# Patient Record
Sex: FEMALE | Race: BLACK OR AFRICAN AMERICAN | NOT HISPANIC OR LATINO | URBAN - METROPOLITAN AREA
[De-identification: names, ages, dates, MRNs, and addresses within clinical notes are randomized per-mention and may not be internally consistent; named-entity substitution may affect disease eponyms.]

---

## 2019-12-10 ENCOUNTER — EMERGENCY (EMERGENCY)
Facility: HOSPITAL | Age: 59
LOS: 0 days | Discharge: HOME | End: 2019-12-10
Attending: EMERGENCY MEDICINE
Payer: MEDICAID

## 2019-12-10 VITALS
RESPIRATION RATE: 18 BRPM | OXYGEN SATURATION: 100 % | HEART RATE: 59 BPM | TEMPERATURE: 98 F | SYSTOLIC BLOOD PRESSURE: 136 MMHG | DIASTOLIC BLOOD PRESSURE: 70 MMHG

## 2019-12-10 VITALS — WEIGHT: 179.02 LBS | HEIGHT: 67 IN

## 2019-12-10 DIAGNOSIS — R05 COUGH: ICD-10-CM

## 2019-12-10 DIAGNOSIS — I10 ESSENTIAL (PRIMARY) HYPERTENSION: ICD-10-CM

## 2019-12-10 DIAGNOSIS — B34.9 VIRAL INFECTION, UNSPECIFIED: ICD-10-CM

## 2019-12-10 PROCEDURE — 99283 EMERGENCY DEPT VISIT LOW MDM: CPT

## 2019-12-10 RX ORDER — BROMPHENIRAMINE MALEATE, PSEUDOEPHEDRINE HYDROCHLORIDE, AND DEXTROMETHORPHAN HYDROBROMIDE 2; 10; 30 MG/5ML; MG/5ML; MG/5ML
5 SOLUTION ORAL
Qty: 100 | Refills: 0
Start: 2019-12-10 | End: 2019-12-14

## 2019-12-10 NOTE — ED STATDOCS - PATIENT PORTAL LINK FT
You can access the FollowMyHealth Patient Portal offered by [facility] by registering at the following website: [email or web address]. By joining [employer]’s FollowMyHealth portal, you will also be able to view your health information using other applications (apps) compatible with our system.

## 2019-12-10 NOTE — ED STATDOCS - ATTENDING CONTRIBUTION TO CARE
I, Brissa De León MD,  performed the initial face to face bedside interview with this patient regarding history of present illness, review of symptoms and relevant past medical, social and family history.  I completed an independent physical examination.  I was the initial provider who evaluated this patient. I have signed out the follow up of any pending tests (i.e. labs, radiological studies) to the ACP.  I have communicated the patient’s plan of care and disposition with the ACP.  The history, relevant review of systems, past medical and surgical history, medical decision making, and physical examination was documented by the scribe in my presence and I attest to the accuracy of the documentation.

## 2019-12-10 NOTE — ED STATDOCS - PROGRESS NOTE DETAILS
57 yo female with a PMH of htn presents with cough x 1 week. Pt was evaluated by urgent care and was rx z pack and was taking Robitussin without relief. Denies fever, cp, sob. Discussed with pt that symptoms may still linger for weeks because it appears to be related to a viral infection. Pt states the robitussin wasn't working and would like a different cough syrup. Will rx bromifed and tessalon perles to her pharmacy but also instructed to hydrate and rest to help with any kind of virus. Pt aware and agrees with plan. -aRh Banerjee PA-C

## 2019-12-10 NOTE — ED STATDOCS - OBJECTIVE STATEMENT
57 y/o F with no relevant PMHx presents to the ED c/o a cough. Pt was seen at  on 12/3 and was prescribed abx and Robitussin with which she has been compliant. States that medications have provided no relief in symptoms. States she was sick before going to . Denies N/V.

## 2019-12-10 NOTE — ED STATDOCS - NSFOLLOWUPINSTRUCTIONS_ED_ALL_ED_FT
Upper Respiratory Infection, Adult  An upper respiratory infection (URI) affects the nose, throat, and upper air passages. URIs are caused by germs (viruses). The most common type of URI is often called "the common cold."    Medicines cannot cure URIs, but you can do things at home to relieve your symptoms. URIs usually get better within 7–10 days.    Follow these instructions at home:  Activity     Rest as needed.  If you have a fever, stay home from work or school until your fever is gone, or until your doctor says you may return to work or school.    You should stay home until you cannot spread the infection anymore (you are not contagious).  Your doctor may have you wear a face mask so you have less risk of spreading the infection.    Relieving symptoms     Gargle with a salt-water mixture 3–4 times a day or as needed. To make a salt-water mixture, completely dissolve ½–1 tsp of salt in 1 cup of warm water.  Use a cool-mist humidifier to add moisture to the air. This can help you breathe more easily.  Eating and drinking     Drink enough fluid to keep your pee (urine) pale yellow.  ImageEat soups and other clear broths.  General instructions     Take over-the-counter and prescription medicines only as told by your doctor. These include cold medicines, fever reducers, and cough suppressants.  Do not use any products that contain nicotine or tobacco. These include cigarettes and e-cigarettes. If you need help quitting, ask your doctor.  Avoid being where people are smoking (avoid secondhand smoke).  Make sure you get regular shots and get the flu shot every year.  ImageKeep all follow-up visits as told by your doctor. This is important.  How to avoid spreading infection to others     Wash your hands often with soap and water. If you do not have soap and water, use hand .  Avoid touching your mouth, face, eyes, or nose.  ImageCough or sneeze into a tissue or your sleeve or elbow. Do not cough or sneeze into your hand or into the air.  Contact a doctor if:  You are getting worse, not better.  You have any of these:    A fever.  Chills.  Brown or red mucus in your nose.  Yellow or brown fluid (discharge)coming from your nose.  Pain in your face, especially when you bend forward.  Swollen neck glands.  Pain with swallowing.  White areas in the back of your throat.    Get help right away if:  You have shortness of breath that gets worse.  You have very bad or constant:    Headache.  Ear pain.  Pain in your forehead, behind your eyes, and over your cheekbones (sinus pain).  Chest pain.    You have long-lasting (chronic) lung disease along with any of these:    Wheezing.  Long-lasting cough.  Coughing up blood.  A change in your usual mucus.    You have a stiff neck.  You have changes in your:    Vision.  Hearing.  Thinking.  Mood.    Summary  An upper respiratory infection (URI) is caused by a germ called a virus. The most common type of URI is often called "the common cold."  URIs usually get better within 7–10 days.  Take over-the-counter and prescription medicines only as told by your doctor.  This information is not intended to replace advice given to you by your health care provider. Make sure you discuss any questions you have with your health care provider.

## 2019-12-10 NOTE — ED ADULT TRIAGE NOTE - CHIEF COMPLAINT QUOTE
Pt reports cough for which she was evaluated by urgent care. Pt reports taking medication, but cont to have cough.

## 2020-03-07 ENCOUNTER — EMERGENCY (EMERGENCY)
Facility: HOSPITAL | Age: 60
LOS: 0 days | Discharge: HOME | End: 2020-03-07
Attending: EMERGENCY MEDICINE
Payer: MEDICAID

## 2020-03-07 VITALS — HEIGHT: 67 IN | WEIGHT: 181 LBS

## 2020-03-07 VITALS
SYSTOLIC BLOOD PRESSURE: 129 MMHG | DIASTOLIC BLOOD PRESSURE: 84 MMHG | RESPIRATION RATE: 18 BRPM | HEART RATE: 64 BPM | TEMPERATURE: 98 F | OXYGEN SATURATION: 100 %

## 2020-03-07 DIAGNOSIS — E11.9 TYPE 2 DIABETES MELLITUS WITHOUT COMPLICATIONS: ICD-10-CM

## 2020-03-07 DIAGNOSIS — I10 ESSENTIAL (PRIMARY) HYPERTENSION: ICD-10-CM

## 2020-03-07 DIAGNOSIS — M79.10 MYALGIA, UNSPECIFIED SITE: ICD-10-CM

## 2020-03-07 DIAGNOSIS — Z79.899 OTHER LONG TERM (CURRENT) DRUG THERAPY: ICD-10-CM

## 2020-03-07 DIAGNOSIS — R53.83 OTHER FATIGUE: ICD-10-CM

## 2020-03-07 PROCEDURE — 93005 ELECTROCARDIOGRAM TRACING: CPT

## 2020-03-07 PROCEDURE — 93010 ELECTROCARDIOGRAM REPORT: CPT

## 2020-03-07 PROCEDURE — 99283 EMERGENCY DEPT VISIT LOW MDM: CPT

## 2020-03-07 NOTE — ED STATDOCS - NSFOLLOWUPINSTRUCTIONS_ED_ALL_ED_FT
Fatigue  If you have fatigue, you feel tired all the time and have a lack of energy or a lack of motivation. Fatigue may make it difficult to start or complete tasks because of exhaustion. In general, occasional or mild fatigue is often a normal response to activity or life. However, long-lasting (chronic) or extreme fatigue may be a symptom of a medical condition.  Follow these instructions at home:  General instructions     Watch your fatigue for any changes.Go to bed and get up at the same time every day.Avoid fatigue by pacing yourself during the day and getting enough sleep at night.Maintain a healthy weight.Medicines     Take over-the-counter and prescription medicines only as told by your health care provider.Take a multivitamin, if told by your health care provider. Do not use herbal or dietary supplements unless they are approved by your health care provider.Activity        Exercise regularly, as told by your health care provider.Use or practice techniques to help you relax, such as yoga, benjamin chi, meditation, or massage therapy.Eating and drinking        Avoid heavy meals in the evening.Eat a well-balanced diet, which includes lean proteins, whole grains, plenty of fruits and vegetables, and low-fat dairy products.Avoid consuming too much caffeine.Avoid the use of alcohol.Drink enough fluid to keep your urine pale yellow.Lifestyle     Change situations that cause you stress. Try to keep your work and personal schedule in balance.Do not use any products that contain nicotine or tobacco, such as cigarettes and e-cigarettes. If you need help quitting, ask your health care provider.Do not use drugs.Contact a health care provider if:  Your fatigue does not get better.You have a fever.You suddenly lose or gain weight.You have headaches.You have trouble falling asleep or sleeping through the night.You feel angry, guilty, anxious, or sad.You are unable to have a bowel movement (constipation).Your skin is dry.You have swelling in your legs or another part of your body.Get help right away if:  You feel confused.Your vision is blurry.You feel faint or you pass out.You have a severe headache.You have severe pain in your abdomen, your back, or the area between your waist and hips (pelvis).You have chest pain, shortness of breath, or an irregular or fast heartbeat.You are unable to urinate, or you urinate less than normal.You have abnormal bleeding, such as bleeding from the rectum, vagina, nose, lungs, or nipples.You vomit blood.You have thoughts about hurting yourself or others.If you ever feel like you may hurt yourself or others, or have thoughts about taking your own life, get help right away. You can go to your nearest emergency department or call:   Your local emergency services (911 in the [place] A suicide crisis helpline, such as the National Suicide Prevention Lifeline at 1-771.772.2769. This is open 24 hours a day. Summary  If you have fatigue, you feel tired all the time and have a lack of energy or a lack of motivation.Fatigue may make it difficult to start or complete tasks because of exhaustion.Long-lasting (chronic) or extreme fatigue may be a symptom of a medical condition.Exercise regularly, as told by your health care provider.Change situations that cause you stress. Try to keep your work and personal schedule in balance.This information is not intended to replace advice given to you by your health care provider. Make sure you discuss any questions you have with your health care provider.

## 2020-03-07 NOTE — ED STATDOCS - OBJECTIVE STATEMENT
60 y/o female with a PMHx of HTN or DM, presents to the ED c/o intermittent fatigue x days. States that when she feels tired, it is moreso of a weak sensation. Denies fever, cough, chills, runny nose, abd pain, or vaginal bleeding Does not have a hx of feeling this in past. Taken amlodipine today. PCP is in [place]. Next appointment is 3/27/2020. Pt works as a home health aide. No other complaints at this time.

## 2020-03-07 NOTE — ED STATDOCS - PROGRESS NOTE DETAILS
Patient seen and evaluated with ED attending at intake.  Normal vitals, well appearing, no acute complaints except feeling extra fatigued the past couple of days.  She lives in the [place] and gets her care there.  recommended some screening tests which she declines as she needs to leave.  She will follow up with her PMD -Belén Zazueta PA-C

## 2020-03-07 NOTE — ED STATDOCS - PATIENT PORTAL LINK FT
You can access the FollowMyHealth Patient Portal offered by [facility] by registering at the following website: [email or web address]. By joining [employer]’s FollowMyHealth portal, you will also be able to view your health information using other applications (apps) compatible with our system.

## 2022-07-08 ENCOUNTER — EMERGENCY (EMERGENCY)
Facility: HOSPITAL | Age: 62
LOS: 0 days | Discharge: HOME | End: 2022-07-08
Attending: EMERGENCY MEDICINE
Payer: MEDICAID

## 2022-07-08 VITALS — HEIGHT: 67 IN | WEIGHT: 179.9 LBS

## 2022-07-08 VITALS
TEMPERATURE: 98 F | RESPIRATION RATE: 18 BRPM | SYSTOLIC BLOOD PRESSURE: 113 MMHG | HEART RATE: 56 BPM | OXYGEN SATURATION: 100 % | DIASTOLIC BLOOD PRESSURE: 69 MMHG

## 2022-07-08 DIAGNOSIS — I10 ESSENTIAL (PRIMARY) HYPERTENSION: ICD-10-CM

## 2022-07-08 DIAGNOSIS — E11.9 TYPE 2 DIABETES MELLITUS WITHOUT COMPLICATIONS: ICD-10-CM

## 2022-07-08 DIAGNOSIS — R10.10 UPPER ABDOMINAL PAIN, UNSPECIFIED: ICD-10-CM

## 2022-07-08 DIAGNOSIS — R07.89 OTHER CHEST PAIN: ICD-10-CM

## 2022-07-08 DIAGNOSIS — Z20.822 CONTACT WITH AND (SUSPECTED) EXPOSURE TO COVID-19: ICD-10-CM

## 2022-07-08 DIAGNOSIS — M54.9 DORSALGIA, UNSPECIFIED: ICD-10-CM

## 2022-07-08 LAB
ALBUMIN SERPL ELPH-MCNC: 3.9 G/DL — SIGNIFICANT CHANGE UP (ref 3.3–5)
ALP SERPL-CCNC: 132 U/L — HIGH (ref 40–120)
ALT FLD-CCNC: 22 U/L — SIGNIFICANT CHANGE UP (ref 12–78)
ANION GAP SERPL CALC-SCNC: 4 MMOL/L — LOW (ref 5–17)
APPEARANCE UR: CLEAR — SIGNIFICANT CHANGE UP
AST SERPL-CCNC: 14 U/L — LOW (ref 15–37)
BASOPHILS # BLD AUTO: 0.03 K/UL — SIGNIFICANT CHANGE UP (ref 0–0.2)
BASOPHILS NFR BLD AUTO: 0.4 % — SIGNIFICANT CHANGE UP (ref 0–2)
BILIRUB SERPL-MCNC: 0.5 MG/DL — SIGNIFICANT CHANGE UP (ref 0.2–1.2)
BILIRUB UR-MCNC: NEGATIVE — SIGNIFICANT CHANGE UP
BUN SERPL-MCNC: 11 MG/DL — SIGNIFICANT CHANGE UP (ref 7–23)
CALCIUM SERPL-MCNC: 9.7 MG/DL — SIGNIFICANT CHANGE UP (ref 8.5–10.1)
CHLORIDE SERPL-SCNC: 105 MMOL/L — SIGNIFICANT CHANGE UP (ref 96–108)
CO2 SERPL-SCNC: 31 MMOL/L — SIGNIFICANT CHANGE UP (ref 22–31)
COLOR SPEC: ABNORMAL
CREAT SERPL-MCNC: 0.92 MG/DL — SIGNIFICANT CHANGE UP (ref 0.5–1.3)
DIFF PNL FLD: ABNORMAL
EGFR: 71 ML/MIN/1.73M2 — SIGNIFICANT CHANGE UP
EOSINOPHIL # BLD AUTO: 0.11 K/UL — SIGNIFICANT CHANGE UP (ref 0–0.5)
EOSINOPHIL NFR BLD AUTO: 1.4 % — SIGNIFICANT CHANGE UP (ref 0–6)
GLUCOSE SERPL-MCNC: 90 MG/DL — SIGNIFICANT CHANGE UP (ref 70–99)
GLUCOSE UR QL: NEGATIVE — SIGNIFICANT CHANGE UP
HCT VFR BLD CALC: 39.2 % — SIGNIFICANT CHANGE UP (ref 34.5–45)
HGB BLD-MCNC: 12.9 G/DL — SIGNIFICANT CHANGE UP (ref 11.5–15.5)
IMM GRANULOCYTES NFR BLD AUTO: 0.2 % — SIGNIFICANT CHANGE UP (ref 0–1.5)
KETONES UR-MCNC: ABNORMAL
LEUKOCYTE ESTERASE UR-ACNC: ABNORMAL
LYMPHOCYTES # BLD AUTO: 3.15 K/UL — SIGNIFICANT CHANGE UP (ref 1–3.3)
LYMPHOCYTES # BLD AUTO: 39.1 % — SIGNIFICANT CHANGE UP (ref 13–44)
MCHC RBC-ENTMCNC: 29.5 PG — SIGNIFICANT CHANGE UP (ref 27–34)
MCHC RBC-ENTMCNC: 32.9 GM/DL — SIGNIFICANT CHANGE UP (ref 32–36)
MCV RBC AUTO: 89.7 FL — SIGNIFICANT CHANGE UP (ref 80–100)
MONOCYTES # BLD AUTO: 0.78 K/UL — SIGNIFICANT CHANGE UP (ref 0–0.9)
MONOCYTES NFR BLD AUTO: 9.7 % — SIGNIFICANT CHANGE UP (ref 2–14)
NEUTROPHILS # BLD AUTO: 3.97 K/UL — SIGNIFICANT CHANGE UP (ref 1.8–7.4)
NEUTROPHILS NFR BLD AUTO: 49.2 % — SIGNIFICANT CHANGE UP (ref 43–77)
NITRITE UR-MCNC: NEGATIVE — SIGNIFICANT CHANGE UP
PH UR: 5 — SIGNIFICANT CHANGE UP (ref 5–8)
PLATELET # BLD AUTO: 337 K/UL — SIGNIFICANT CHANGE UP (ref 150–400)
POTASSIUM SERPL-MCNC: 3.7 MMOL/L — SIGNIFICANT CHANGE UP (ref 3.5–5.3)
POTASSIUM SERPL-SCNC: 3.7 MMOL/L — SIGNIFICANT CHANGE UP (ref 3.5–5.3)
PROT SERPL-MCNC: 8.4 GM/DL — HIGH (ref 6–8.3)
PROT UR-MCNC: 15
RBC # BLD: 4.37 M/UL — SIGNIFICANT CHANGE UP (ref 3.8–5.2)
RBC # FLD: 14.2 % — SIGNIFICANT CHANGE UP (ref 10.3–14.5)
SODIUM SERPL-SCNC: 140 MMOL/L — SIGNIFICANT CHANGE UP (ref 135–145)
SP GR SPEC: 1.02 — SIGNIFICANT CHANGE UP (ref 1.01–1.02)
TROPONIN I, HIGH SENSITIVITY RESULT: 3.16 NG/L — SIGNIFICANT CHANGE UP
UROBILINOGEN FLD QL: 1
WBC # BLD: 8.06 K/UL — SIGNIFICANT CHANGE UP (ref 3.8–10.5)
WBC # FLD AUTO: 8.06 K/UL — SIGNIFICANT CHANGE UP (ref 3.8–10.5)

## 2022-07-08 PROCEDURE — 84484 ASSAY OF TROPONIN QUANT: CPT

## 2022-07-08 PROCEDURE — 80053 COMPREHEN METABOLIC PANEL: CPT

## 2022-07-08 PROCEDURE — 74176 CT ABD & PELVIS W/O CONTRAST: CPT | Mod: MD

## 2022-07-08 PROCEDURE — 87086 URINE CULTURE/COLONY COUNT: CPT

## 2022-07-08 PROCEDURE — 99285 EMERGENCY DEPT VISIT HI MDM: CPT

## 2022-07-08 PROCEDURE — 93005 ELECTROCARDIOGRAM TRACING: CPT

## 2022-07-08 PROCEDURE — 85025 COMPLETE CBC W/AUTO DIFF WBC: CPT

## 2022-07-08 PROCEDURE — 99285 EMERGENCY DEPT VISIT HI MDM: CPT | Mod: 25

## 2022-07-08 PROCEDURE — 71250 CT THORAX DX C-: CPT | Mod: MD

## 2022-07-08 PROCEDURE — 96372 THER/PROPH/DIAG INJ SC/IM: CPT

## 2022-07-08 PROCEDURE — 36415 COLL VENOUS BLD VENIPUNCTURE: CPT

## 2022-07-08 PROCEDURE — 71250 CT THORAX DX C-: CPT | Mod: 26,MD

## 2022-07-08 PROCEDURE — 74176 CT ABD & PELVIS W/O CONTRAST: CPT | Mod: 26,MD

## 2022-07-08 PROCEDURE — U0003: CPT

## 2022-07-08 PROCEDURE — 81001 URINALYSIS AUTO W/SCOPE: CPT

## 2022-07-08 PROCEDURE — U0005: CPT

## 2022-07-08 PROCEDURE — 93010 ELECTROCARDIOGRAM REPORT: CPT

## 2022-07-08 RX ORDER — SODIUM CHLORIDE 9 MG/ML
1000 INJECTION INTRAMUSCULAR; INTRAVENOUS; SUBCUTANEOUS ONCE
Refills: 0 | Status: COMPLETED | OUTPATIENT
Start: 2022-07-08 | End: 2022-07-08

## 2022-07-08 RX ORDER — SODIUM CHLORIDE 9 MG/ML
100 INJECTION INTRAMUSCULAR; INTRAVENOUS; SUBCUTANEOUS ONCE
Refills: 0 | Status: DISCONTINUED | OUTPATIENT
Start: 2022-07-08 | End: 2022-07-08

## 2022-07-08 RX ORDER — KETOROLAC TROMETHAMINE 30 MG/ML
30 SYRINGE (ML) INJECTION ONCE
Refills: 0 | Status: DISCONTINUED | OUTPATIENT
Start: 2022-07-08 | End: 2022-07-08

## 2022-07-08 RX ADMIN — Medication 30 MILLIGRAM(S): at 20:49

## 2022-07-08 RX ADMIN — SODIUM CHLORIDE 1000 MILLILITER(S): 9 INJECTION INTRAMUSCULAR; INTRAVENOUS; SUBCUTANEOUS at 18:16

## 2022-07-08 NOTE — ED STATDOCS - NEUROLOGICAL, MLM
sensation is normal and strength is normal. sensation is normal and strength is normal. CNs 2-12 intact. NIH=0 GCS=15. No nuchal rigidity. No saddle anesthesia.

## 2022-07-08 NOTE — ED STATDOCS - CHPI ED SYMPTOM NEG
no fever no burning urination/no fever/no abdominal distention/no blood in stool/no vomiting/no palpitations

## 2022-07-08 NOTE — ED ADULT TRIAGE NOTE - CHIEF COMPLAINT QUOTE
patient presenting ambulatory to ED c/o back pain x2 days. denies fall, injury. patient able to ambulate at baseline.

## 2022-07-08 NOTE — ED STATDOCS - NSFOLLOWUPINSTRUCTIONS_ED_ALL_ED_FT
Please note that I have provided you with the results of your labs and imaging. As discussed your imaging has certain findings of abnormalities that require you to see a gastroenterologist as soon as possible. There is finding of a colonic mass in the Rt abdomen however you have no obstruction in the imaging, you are eating and drinking and you have no fever or chills. YOU MUST SEE A GASTROENTEROLOGIST IN 4 DAYS OR LESS, and if you can not make an appointment with your own gastroenterologist since you wish to go to [place] you must return to us immediately or contact the number provided here for an gastroenterologist. You also need to see a pulmonary doctor. If you do not have your own then I have provided you with the contact number for one. Please return to us immediately if you have any fever, chills, can not eat, worsening of pain or if you have any other concerns.    Please make sure that you show the copies of the images that I printed for you and that are also included in this discharge form and show them to your primary care physician as you will require further abdominal imaging such as an MRI or further testing such as a colonoscopy. Please return to us immediately if you have any other health concerns.     Please return to us if your pain is worsened. As discussed while less likely you also have to consider possibility of stress on the heart so if you have pain in the center of the chest or if you have any other health concerns return to us and definitely arrange for a follow up with a cardiology too. If you do not have your own then I have provided you with the number for one to contact here.     ________________      Nonspecific Chest Pain, Adult      Chest pain is an uncomfortable, tight, or painful feeling in the chest. The pain can feel like a crushing, aching, or squeezing pressure. A person can feel a burning or tingling sensation. Chest pain can also be felt in your back, neck, jaw, shoulder, or arm. This pain can be worse when you move, sneeze, or take a deep breath.    Chest pain can be caused by a condition that is life-threatening. This must be treated right away. It can also be caused by something that is not life-threatening. If you have chest pain, it can be hard to know the difference, so it is important to get help right away to make sure that you do not have a serious condition.    Some life-threatening causes of chest pain include:  •Heart attack.      •A tear in the body's main blood vessel (aortic dissection).      •Inflammation around your heart (pericarditis).      •A problem in the lungs, such as a blood clot (pulmonary embolism) or a collapsed lung (pneumothorax).      Some non life-threatening causes of chest pain include:  •Heartburn.      •Anxiety or stress.      •Damage to the bones, muscles, and cartilage that make up your chest wall.      •Pneumonia or bronchitis.      •Shingles infection (varicella-zoster virus).      Your chest pain may come and go. It may also be constant. Your health care provider will do tests and other studies to find the cause of your pain. Treatment will depend on the cause of your chest pain.      Follow these instructions at home:    Medicines     •Take over-the-counter and prescription medicines only as told by your health care provider.      •If you were prescribed an antibiotic medicine, take it as told by your health care provider. Do not stop taking the antibiotic even if you start to feel better.      Activity     •Avoid any activities that cause chest pain.      • Do not lift anything that is heavier than 10 lb (4.5 kg), or the limit that you are told, until your health care provider says that it is safe.      •Rest as directed by your health care provider.       •Return to your normal activities only as told by your health care provider. Ask your health care provider what activities are safe for you.        Lifestyle       A plate along with examples of foods in a healthy diet.       Silhouette of a person sitting on the floor doing yoga.     • Do not use any products that contain nicotine or tobacco, such as cigarettes, e-cigarettes, and chewing tobacco. If you need help quitting, ask your health care provider.      • Do not drink alcohol.    •Make healthy lifestyle changes as recommended. These may include:  •Getting regular exercise. Ask your health care provider to suggest some exercises that are safe for you.      •Eating a heart-healthy diet. This includes plenty of fresh fruits and vegetables, whole grains, low-fat (lean) protein, and low-fat dairy products. A dietitian can help you find healthy eating options.      •Maintaining a healthy weight.      •Managing any other health conditions you may have, such as high blood pressure (hypertension) or diabetes.      •Reducing stress, such as with yoga or relaxation techniques.        General instructions     •Pay attention to any changes in your symptoms.      •It is up to you to get the results of any tests that were done. Ask your health care provider, or the department that is doing the tests, when your results will be ready.      •Keep all follow-up visits as told by your health care provider. This is important.       •You may be asked to go for further testing if your chest pain does not go away.        Contact a health care provider if:    •Your chest pain does not go away.      •You feel depressed.      •You have a fever.      •You notice changes in your symptoms or develop new symptoms.        Get help right away if:    •Your chest pain gets worse.      •You have a cough that gets worse, or you cough up blood.      •You have severe pain in your abdomen.      •You faint.      •You have sudden, unexplained chest discomfort.      •You have sudden, unexplained discomfort in your arms, back, neck, or jaw.      •You have shortness of breath at any time.      •You suddenly start to sweat, or your skin gets clammy.      •You feel nausea or you vomit.      •You suddenly feel lightheaded or dizzy.      •You have severe weakness, or unexplained weakness or fatigue.      •Your heart begins to beat quickly, or it feels like it is skipping beats.      These symptoms may represent a serious problem that is an emergency. Do not wait to see if the symptoms will go away. Get medical help right away. Call your local emergency services (911 in the [place] Do not drive yourself to the hospital.       Summary    •Chest pain can be caused by a condition that is serious and requires urgent treatment. It may also be caused by something that is not life-threatening.      •Your health care provider may do lab tests and other studies to find the cause of your pain.      •Follow your health care provider's instructions on taking medicines, making lifestyle changes, and getting emergency treatment if symptoms become worse.      •Keep all follow-up visits as told by your health care provider. This includes visits for any further testing if your chest pain does not go away.      This information is not intended to replace advice given to you by your health care provider. Make sure you discuss any questions you have with your health care provider.    ____________      Colon Mass, Adult       A colon mass is an abnormal growth in the colon, which is part of the large intestine. A mass can cause a blockage (obstruction) or bleeding in the colon.      What are the causes?    This condition may be caused by:  •Cancer.      •Blood vessel problems.      •Infection.    •Certain colon or bowel diseases. These include:  •Inflammatory bowel disease, such as ulcerative colitis or Crohn's disease.      •Diverticulitis. This is inflammation or infection of small pouches in the bowel.        •Endometriosis. This is a condition in which the lining of the uterus grows outside of the uterus.      •Scar tissue (adhesions) from a past surgery on the abdomen or from benign tumors such as a lipoma, teratoma, or hemangioma.      •Volvulus. This is twisting of the intestine.        What are the signs or symptoms?    Symptoms of this condition include:  •Cramping, nausea, diarrhea, or vomiting.      •A fever or feeling weak.      •Pain in the abdomen, side, or back.      •Weight loss or loss of appetite.      •Constipation or changes in bowel habits.      •Bleeding from the rectum.      •Feeling the need to have a bowel movement after having had one recently.      In some cases, there are no symptoms of this condition.      How is this diagnosed?    This condition may be diagnosed based on tests and procedures that are done to learn more about the mass. These may include:  •Blood tests.      •X-rays, ultrasound, a CT scan, or an MRI.      •Colonoscopy. In this procedure, a flexible tube that has oil or gel on it (is lubricated) is inserted into the opening between the buttocks (anus) and then passed into the rectum and colon to examine the areas.      •Biopsy. This is removal of a tissue sample from the mass to be looked at under a microscope. A biopsy may be taken during a colonoscopy.      In some cases, what seems like a colon mass may actually be something else, such as scarring that formed after a surgery or an ulcer.      How is this treated?    Treatment for this condition depends on the cause of the mass. You and your health care provider will discuss the meaning of your test results and the recommended steps for starting treatment. If there are serious concerns, emergency surgery may be needed.      Follow these instructions at home:    What you need to do at home depends on the cause of the mass. Follow instructions from your health care provider. In general:  •Take over-the-counter and prescription medicines only as told by your health care provider.      •Keep all follow-up visits. This is important.        Contact a health care provider if:    •Your symptoms get worse.      •You have new symptoms.      •You have a fever.      •Medicine does not help your pain.      •You feel weaker.      •You bruise or bleed easily.        Get help right away if:

## 2022-07-08 NOTE — ED STATDOCS - NS ED ATTENDING STATEMENT MOD
This was a shared visit with the ANU. I reviewed and verified the documentation and independently performed the documented:

## 2022-07-08 NOTE — ED STATDOCS - ATTENDING APP SHARED VISIT CONTRIBUTION OF CARE
I Chris Sears MD saw and examined the patient. MLP saw and examined the patient under my supervision. I discussed the care of the patient with MLP and agree with MLP's plan, assessment and care of the patient while in the ED.

## 2022-07-08 NOTE — ED STATDOCS - CARE PROVIDER_API CALL
Viral Angulo)  Gastroenterology; Internal Medicine  [address], [address]  [place] [address]  Phone: [phone]  Fax: [phone]  Follow Up Time:     Serg Juárez)  Internal Medicine; Pulmonary Disease  [address]  [place] [address]  Phone: [phone]  Fax: [phone]  Follow Up Time:

## 2022-07-08 NOTE — ED STATDOCS - CLINICAL SUMMARY MEDICAL DECISION MAKING FREE TEXT BOX
Pt with non pleuritic right thoracic CP and right flank pain. Pt on Zithro x2days. Will get EKG, 1 set of trop to r/o acs. Given that pt states the pain is 8/10 and pain is in ribs/flank and age of 61 will get [address] non con of chest and abd to r/o pathology. If unremarkable will dc.

## 2022-07-08 NOTE — ED STATDOCS - SOCIAL CONCERNS
Message   Recorded as Task   Date: 05/31/2016 01:16 PM, Created By: Ariadna Tapia   Task Name: Med Renewal Request   Assigned To: Gerson Salazar   Regarding Patient: Brandon Browne, Status: Active   CommentKathy Cunha - 31 May 2016 1:16 PM     TASK CREATED    sent rx to jessica campbell w/ cg in june        Active Problems    1  Abdominal left lower quadrant tenderness (789 64) (R10 814)   2  Acute conjunctivitis (372 00) (H10 30)   3  Acute otitis media (382 9) (H66 90)   4  Acute sinusitis (461 9) (J01 90)   5  Anxiety (300 00) (F41 9)   6  Back pain (724 5) (M54 9)   7  Bloating (787 3) (R14 0)   8  Chronic constipation (564 00) (K59 09)   9  Contraception (V25 9) (Z30 9)   10  Generalized anxiety disorder (300 02) (F41 1)   11  GERD without esophagitis (530 81) (K21 9)   12  Globus sensation (306 4) (F45 8)   13  Hyperlipidemia (272 4) (E78 5)   14  Laboratory examination ordered as part of a routine general medical examination    (V72 62) (Z00 00)   15  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)   16  Nausea (787 02) (R11 0)   17  Sorethroat (462) (J02 9)    Current Meds   1  Apri 0 15-30 MG-MCG Oral Tablet; TAKE 1 TABLET DAILY; Therapy: 74EBU0574 to (Candelaria Millard)  Requested for: 58SCI1154; Last   Rx:[id number] Ordered   2  Colace 100 MG Oral Capsule; TAKE 1 CAPSULE DAILY; Therapy: 66VDT7098 to (Evaluate:17Mar2017)  Requested for: 48PYS0068; Last   Rx:22Mar2016 Ordered   3  Fluticasone Propionate 50 MCG/ACT Nasal Suspension (Flonase); USE 2 SPRAYS IN   EACH NOSTRIL ONCE DAILY; Therapy: 13RAL0991 to (Mary Ramos)  Requested for: 35ZRU8014; Last   Rx:[id number] Ordered   4  MiraLax Oral Powder (Polyethylene Glycol 3350); MIX 1 CAPFUL IN 8 OUNCES OF   WATER AND DRINK DAILY AS DIRECTED; Therapy: (Recorded:14Ruy1805) to Recorded   5  Polyethylene Glycol 3350 Oral Packet (MiraLax); MIX 1 PACKET IN 8 OUNCES OF   LIQUID AND DRINK ONCE DAILY;    Therapy: 70HJC0104 to (Evaluate:17Mar2017)  Requested for: 29XVQ9711; Last   Rx:22Mar2016 Ordered   6  Prenatal 28-0 8 MG Oral Tablet; TAKE 1 TABLET DAILY; Therapy: (Recorded:28Lrt3596) to Recorded   7  Probiotic CAPS; Therapy: (Recorded:90Xnp8585) to Recorded   8  Sertraline HCl - 25 MG Oral Tablet (Zoloft); TAKE 1 TABLET DAILY; Therapy: 07JBC0617 to (Suly Husbands)  Requested for: 96SWI8710; Last   SW:07MRV5778; Status: ACTIVE - Renewal Denied Ordered   9  Sulfamethoxazole-Trimethoprim 800-160 MG Oral Tablet (Bactrim DS); Take 1 tablet   twice daily; Therapy: 17KHK9615 to (Evaluate:26May2016); Last Rx:19May2016 Ordered    Allergies    1  Amoxicillin-Pot Clavulanate TABS   2  fluoxetine   3  Penicillins    4  No Known Food Allergies   5   Seasonal    Plan  PMH: History of constipation    · Apri 0 15-30 MG-MCG Oral Tablet; TAKE 1 TABLET DAILY    Signatures   Electronically signed by : Deyanira Calderón, ; May 31 2016  1:17PM EST                       (Author) None

## 2022-07-08 NOTE — ED STATDOCS - NS_ ATTENDINGSCRIBEDETAILS _ED_A_ED_FT
I Chris Sears MD saw and examined the patient. Scribe documented for me and under my supervision. I have modified the scribe's documentation where necessary to reflect my history, physical exam and other relevant documentations pertinent to the care of the patient.

## 2022-07-08 NOTE — ED STATDOCS - CARE PLAN
1 Principal Discharge DX:	Flank pain  Secondary Diagnosis:	Chest wall pain   Principal Discharge DX:	Flank pain  Goal:	abnormal CT finding  Secondary Diagnosis:	Chest wall pain

## 2022-07-08 NOTE — ED STATDOCS - PROGRESS NOTE DETAILS
Alcira Kent for attending Dr. Sears   Updated pt on results of labs and imaging. Orion GAINES: Had a full in depth discussion with patient about the results of her labs and imaging. Imaging shows possible Rt sided colonic mass pending further evaluation. Patient is nontoxic appearing, tolerating PO, pain managed, will give patient an additional dose of Toradol to ensure no more pain. No evidence of fracture or dislocation. Patient is ambulatory. Patient is aware that she needs to follow up with her primary care physician, needs to see a gastroenterologist as soon as possible to have a colonoscopy to ensure no evidence of cancer or tumor. Spoke with patient about abnormal findings in the lungs as well. Noted the kidney stone. Patient is comfortable to go home, will follow up with her PMD at [place] and make the necessary arrangements. Orion GAINES: Had a full in depth discussion with patient about the results of her labs and imaging. Imaging shows possible Rt sided colonic mass pending further evaluation. Patient is nontoxic appearing, tolerating PO, pain managed, will give patient an additional dose of Toradol to ensure no more pain. No evidence of fracture or dislocation. Patient is ambulatory. Patient is aware that she needs to follow up with her primary care physician, needs to see a gastroenterologist as soon as possible to have a colonoscopy to ensure no evidence of cancer or tumor. Spoke with patient about abnormal findings in the lungs as well. High suspicion of tumorous or cancer process with incidental discovery in the ED. Patient is AAOx4, understands the need for follow up to see PMD, gastro, and potentially follow up with oncology. Noted the kidney stone. Patient is comfortable to go home, will follow up with her PMD at [place] and make the necessary arrangements.

## 2022-07-08 NOTE — ED STATDOCS - CARE PROVIDERS DIRECT ADDRESSES
,DirectAddress_Unknown,[email or web address], [email or web address]

## 2022-07-08 NOTE — ED STATDOCS - OBJECTIVE STATEMENT
62 yo female w/PMHx of cataracts presents to the ED c/o back pain x2days. Pt states that the pain was sudden onset and describes the pain as sharp, constant, rates pain 8/10. Pt states that the pain is worse when she moves. Denies recent trauma. 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 Denies dysuria, hematuria, CP, fever, or any other complaints. No abd surgeries. No family cardiac hx. No other complaints at this time. 62 yo female w/PMHx of cataracts presents to the ED c/o back pain x2days. Pt states that the pain was sudden onset and describes the pain as sharp, constant, rates pain 8/10. Pt states that the pain is worse when she moves. Denies recent trauma. 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 Denies dysuria, hematuria, CP, fever, abdominal pain, saddle anesthesia, incontinence of urine or stool, skin rash, fever, chills, weight loss, visual or focal neurological complaints or any other complaints. No abd surgeries. No family cardiac hx. No other complaints at this time.

## 2022-07-08 NOTE — ED ADULT NURSE NOTE - SUICIDE SCREENING QUESTION 3
Spoke with patient, is checking BP at home.  Is taking medications as prescribed.    10/18 148/88  10/24 135/89  10/25 117/84  10/26 107/74  10/27 135/94   No

## 2022-07-08 NOTE — ED ADULT NURSE NOTE - OBJECTIVE STATEMENT
Pt reports lower to mid right sided back pain starting 2 days ago. pt denies any trauma or falls to the area. Pt took tylenol this morning for pain without relief. Pt ambulatory.

## 2022-07-08 NOTE — ED STATDOCS - PATIENT PORTAL LINK FT
You can access the FollowMyHealth Patient Portal offered by [facility] by registering at the following website: [email or web address]. By joining [employer]’s FollowMyHealth portal, you will also be able to view your health information using other applications (apps) compatible with our system.

## 2022-07-09 PROBLEM — I10 ESSENTIAL (PRIMARY) HYPERTENSION: Chronic | Status: ACTIVE | Noted: 2020-03-12

## 2022-07-09 PROBLEM — E11.9 TYPE 2 DIABETES MELLITUS WITHOUT COMPLICATIONS: Chronic | Status: ACTIVE | Noted: 2020-03-12

## 2022-07-09 LAB
CULTURE RESULTS: SIGNIFICANT CHANGE UP
SPECIMEN SOURCE: SIGNIFICANT CHANGE UP

## 2023-08-02 NOTE — ED STATDOCS - NSPTACCESSSVCSAPPTDETAILS_ED_ALL_ED_FT
How Severe Is Your Skin Lesion?: mild Have Your Skin Lesions Been Treated?: not been treated Is This A New Presentation, Or A Follow-Up?: Skin Lesion PLEASE MAKE SURE SHE HAS SOME FOLLOW UP, ABNORMAL ct, also if possible prefer to have cardio and pulmonary Methotrexate Counseling:  Patient counseled regarding adverse effects of methotrexate including but not limited to nausea, vomiting, abnormalities in liver function tests. Patients may develop mouth sores, rash, diarrhea, and abnormalities in blood counts. The patient understands that monitoring is required including LFT's and blood counts.  There is a rare possibility of scarring of the liver and lung problems that can occur when taking methotrexate. Persistent nausea, loss of appetite, pale stools, dark urine, cough, and shortness of breath should be reported immediately. Patient advised to discontinue methotrexate treatment at least three months before attempting to become pregnant.  I discussed the need for folate supplements while taking methotrexate.  These supplements can decrease side effects during methotrexate treatment. The patient verbalized understanding of the proper use and possible adverse effects of methotrexate.  All of the patient's questions and concerns were addressed.

## 2023-11-27 NOTE — ED ADULT TRIAGE NOTE - WEIGHT IN KG
77F from home ambulates independently, lives alone with no HHA, PMH of Afib?, on Eliquis, HTN, DM, HLD presenting with fall this evening. Patient states taht she was trying to get something on the floor when she got tripped up and landed hard on her left shoulder. She was too weak and in pain to be able to get up. She was on the ground for 2 hours, until she was found by her son. She has no history of falls, and is unable to explain why she is on Eliquis, and asks to defer to her PCP, Dr. Nieves.     77y Female RHD presents c/o L shoulder pain s/p mechanical fall. Patient states that she tripped and fell when attempting to get something from the floor yesterday night. Patient states that she is in moderate pain and pain is improving with medication. She notes that she lives at home by herself and would like to see if she could get home health aid. Patient wants to speak with her primary care doctor, cardiologist and family before proceeding with surgery. Patient denies HS/LOC. Denies numbness/tingling. Denies fever/chills. Denies pain/injury elsewhere. No other complaints.    HEALTH ISSUES - PROBLEM Dx:  Fall    Afib    HTN (hypertension)    HLD (hyperlipidemia)    DM (diabetes mellitus)    Prophylactic measure    Fracture of humeral head, right, closed    Social problem          MEDICATIONS  (STANDING):  atorvastatin 20 milliGRAM(s) Oral at bedtime  diltiazem    milliGRAM(s) Oral daily  insulin lispro (ADMELOG) corrective regimen sliding scale   SubCutaneous Before meals and at bedtime  lisinopril 10 milliGRAM(s) Oral daily  magnesium oxide 400 milliGRAM(s) Oral three times a day with meals  metoprolol tartrate 100 milliGRAM(s) Oral two times a day  potassium chloride    Tablet ER 40 milliEquivalent(s) Oral once  potassium chloride  10 mEq/100 mL IVPB 10 milliEquivalent(s) IV Intermittent every 1 hour    Allergies    No Known Allergies    Intolerances                            13.1   7.67  )-----------( 158      ( 27 Nov 2023 09:50 )             40.1     27 Nov 2023 09:50    137    |  105    |  16     ----------------------------<  217    3.1     |  25     |  0.91     Ca    9.3        27 Nov 2023 09:50  Phos  2.5       27 Nov 2023 09:50  Mg     1.7       27 Nov 2023 09:50    TPro  6.7    /  Alb  3.1    /  TBili  1.1    /  DBili  0.3    /  AST  18     /  ALT  32     /  AlkPhos  83     27 Nov 2023 09:50      Vital Signs Last 24 Hrs  T(C): 36.6 (11-27-23 @ 08:40), Max: 37.3 (11-26-23 @ 19:29)  T(F): 97.9 (11-27-23 @ 08:40), Max: 99.2 (11-26-23 @ 19:29)  HR: 96 (11-27-23 @ 08:40) (88 - 104)  BP: 138/85 (11-27-23 @ 08:40) (138/85 - 153/87)  BP(mean): --  RR: 16 (11-27-23 @ 08:40) (16 - 18)  SpO2: 96% (11-27-23 @ 08:40) (95% - 98%)  Imaging: XR demonstrates L proximal humerus fracture on wet read    Physical Exam  Gen: NAD, Alert and Awake, Follows Commands  LUE: Skin intact, Moderate Swelling to shoulder noted. Cannot AROM shoulder due to pain. r/u/m/ain/pin function intact. SILT over deltoid. SILT digits 1-5, warm to touch. 2+ radial pulse. Compartments soft and compressible.     A/P: 77y Female with L proximal humerus fracture  -Pain control  -NWB R/L UE   -Sling at all times  -Ice plenty  -Active movement of fingers/elbow encouraged  -Ca/Vit D, outpt osteoporosis workup  -Possible need for surgical intervention discussed with patient  - All question answered  - Continue with care plan as per medicine team  - Needs Medical clearance and cardiac clearance before discharge.   -Follow up with Dr. El Bauer as outpatient within 1 week, call office at [phone]  for appointment   - Ortho stable  for DC   77F from home ambulates independently, lives alone with no HHA, PMH of Afib?, on Eliquis, HTN, DM, HLD presenting with fall this evening. Patient states taht she was trying to get something on the floor when she got tripped up and landed hard on her left shoulder. She was too weak and in pain to be able to get up. She was on the ground for 2 hours, until she was found by her son. She has no history of falls, and is unable to explain why she is on Eliquis, and asks to defer to her PCP, Dr. Nieves.     77y Female RHD presents c/o L shoulder pain s/p mechanical fall. Patient states that she tripped and fell when attempting to get something from the floor yesterday night. Patient states that she is in moderate pain and pain is improving with medication. She notes that she lives at home by herself and would like to see if she could get home health aid. Patient wants to speak with her primary care doctor, cardiologist and family before proceeding with surgery. Patient denies HS/LOC. Denies numbness/tingling. Denies fever/chills. Denies pain/injury elsewhere. No other complaints.    HEALTH ISSUES - PROBLEM Dx:  Fall    Afib    HTN (hypertension)    HLD (hyperlipidemia)    DM (diabetes mellitus)    Prophylactic measure    Fracture of humeral head, right, closed    Social problem          MEDICATIONS  (STANDING):  atorvastatin 20 milliGRAM(s) Oral at bedtime  diltiazem    milliGRAM(s) Oral daily  insulin lispro (ADMELOG) corrective regimen sliding scale   SubCutaneous Before meals and at bedtime  lisinopril 10 milliGRAM(s) Oral daily  magnesium oxide 400 milliGRAM(s) Oral three times a day with meals  metoprolol tartrate 100 milliGRAM(s) Oral two times a day  potassium chloride    Tablet ER 40 milliEquivalent(s) Oral once  potassium chloride  10 mEq/100 mL IVPB 10 milliEquivalent(s) IV Intermittent every 1 hour    Allergies    No Known Allergies    Intolerances                            13.1   7.67  )-----------( 158      ( 27 Nov 2023 09:50 )             40.1     27 Nov 2023 09:50    137    |  105    |  16     ----------------------------<  217    3.1     |  25     |  0.91     Ca    9.3        27 Nov 2023 09:50  Phos  2.5       27 Nov 2023 09:50  Mg     1.7       27 Nov 2023 09:50    TPro  6.7    /  Alb  3.1    /  TBili  1.1    /  DBili  0.3    /  AST  18     /  ALT  32     /  AlkPhos  83     27 Nov 2023 09:50      Vital Signs Last 24 Hrs  T(C): 36.6 (11-27-23 @ 08:40), Max: 37.3 (11-26-23 @ 19:29)  T(F): 97.9 (11-27-23 @ 08:40), Max: 99.2 (11-26-23 @ 19:29)  HR: 96 (11-27-23 @ 08:40) (88 - 104)  BP: 138/85 (11-27-23 @ 08:40) (138/85 - 153/87)  BP(mean): --  RR: 16 (11-27-23 @ 08:40) (16 - 18)  SpO2: 96% (11-27-23 @ 08:40) (95% - 98%)  Imaging: XR demonstrates L proximal humerus fracture on wet read    Physical Exam  Gen: NAD, Alert and Awake, Follows Commands  LUE: Skin intact, Moderate Swelling to shoulder noted. Cannot AROM shoulder due to pain. r/u/m/ain/pin function intact. SILT over deltoid. SILT digits 1-5, warm to touch. 2+ radial pulse. Compartments soft and compressible.     A/P: 77y Female with L proximal humerus fracture  -Pain control  -NWB R/L UE   -Sling at all times  -Ice plenty  -Active movement of fingers/elbow encouraged  -Ca/Vit D, outpt osteoporosis workup  -Possible need for surgical intervention discussed with patient  - All question answered  - Continue with care plan as per medicine team  - Needs Medical clearance and cardiac clearance before discharge.   -Follow up with Dr. El Bauer as outpatient within 1 week, call office at [phone]  for appointment   - Ortho stable  for DC   77F from home ambulates independently, lives alone with no HHA, PMH of Afib?, on Eliquis, HTN, DM, HLD presenting with fall this evening. Patient states taht she was trying to get something on the floor when she got tripped up and landed hard on her left shoulder. She was too weak and in pain to be able to get up. She was on the ground for 2 hours, until she was found by her son. She has no history of falls, and is unable to explain why she is on Eliquis, and asks to defer to her PCP, Dr. Nieves.     77y Female RHD presents c/o L shoulder pain s/p mechanical fall. Patient states that she tripped and fell when attempting to get something from the floor yesterday night. Patient states that she is in moderate pain and pain is improving with medication. She notes that she lives at home by herself and would like to see if she could get home health aid. Patient wants to speak with her primary care doctor, cardiologist and family before proceeding with surgery. Patient denies HS/LOC. Denies numbness/tingling. Denies fever/chills. Denies pain/injury elsewhere. No other complaints.    HEALTH ISSUES - PROBLEM Dx:  Fall    Afib    HTN (hypertension)    HLD (hyperlipidemia)    DM (diabetes mellitus)    Prophylactic measure    Fracture of humeral head, right, closed    Social problem          MEDICATIONS  (STANDING):  atorvastatin 20 milliGRAM(s) Oral at bedtime  diltiazem    milliGRAM(s) Oral daily  insulin lispro (ADMELOG) corrective regimen sliding scale   SubCutaneous Before meals and at bedtime  lisinopril 10 milliGRAM(s) Oral daily  magnesium oxide 400 milliGRAM(s) Oral three times a day with meals  metoprolol tartrate 100 milliGRAM(s) Oral two times a day  potassium chloride    Tablet ER 40 milliEquivalent(s) Oral once  potassium chloride  10 mEq/100 mL IVPB 10 milliEquivalent(s) IV Intermittent every 1 hour    Allergies    No Known Allergies    Intolerances                            13.1   7.67  )-----------( 158      ( 27 Nov 2023 09:50 )             40.1     27 Nov 2023 09:50    137    |  105    |  16     ----------------------------<  217    3.1     |  25     |  0.91     Ca    9.3        27 Nov 2023 09:50  Phos  2.5       27 Nov 2023 09:50  Mg     1.7       27 Nov 2023 09:50    TPro  6.7    /  Alb  3.1    /  TBili  1.1    /  DBili  0.3    /  AST  18     /  ALT  32     /  AlkPhos  83     27 Nov 2023 09:50      Vital Signs Last 24 Hrs  T(C): 36.6 (11-27-23 @ 08:40), Max: 37.3 (11-26-23 @ 19:29)  T(F): 97.9 (11-27-23 @ 08:40), Max: 99.2 (11-26-23 @ 19:29)  HR: 96 (11-27-23 @ 08:40) (88 - 104)  BP: 138/85 (11-27-23 @ 08:40) (138/85 - 153/87)  BP(mean): --  RR: 16 (11-27-23 @ 08:40) (16 - 18)  SpO2: 96% (11-27-23 @ 08:40) (95% - 98%)  Imaging: XR demonstrates L proximal humerus fracture on wet read    Physical Exam  Gen: NAD, Alert and Awake, Follows Commands  LUE: Skin intact, Moderate Swelling to shoulder noted. Cannot AROM shoulder due to pain. r/u/m/ain/pin function intact. SILT over deltoid. SILT digits 1-5, warm to touch. 2+ radial pulse. Compartments soft and compressible.     A/P: 77y Female with L proximal humerus fracture  -Pain control  -NWB R/L UE   -Sling at all times  -Ice plenty  -Active movement of fingers/elbow encouraged  -Ca/Vit D, outpt osteoporosis workup  -Possible need for surgical intervention discussed with patient  - All question answered  - Continue with care plan as per medicine team  - Needs Medical clearance and cardiac clearance before discharge.   -Follow up with Dr. El Bauer as outpatient within 1 week, call office at [phone]  for appointment   - Ortho stable  for DC   82.1 77F from home ambulates independently, lives alone with no HHA, PMH of Afib?, on Eliquis, HTN, DM, HLD presenting with fall this evening. Patient states taht she was trying to get something on the floor when she got tripped up and landed hard on her left shoulder. She was too weak and in pain to be able to get up. She was on the ground for 2 hours, until she was found by her son. She has no history of falls, and is unable to explain why she is on Eliquis, and asks to defer to her PCP, Dr. Nieves.     77y Female RHD presents c/o L shoulder pain s/p mechanical fall. Patient states that she tripped and fell when attempting to get something from the floor yesterday night. Patient states that she is in moderate pain and pain is improving with medication. She notes that she lives at home by herself and would like to see if she could get home health aid. Patient wants to speak with her primary care doctor, cardiologist and family before proceeding with surgery. Patient denies HS/LOC. Denies numbness/tingling. Denies fever/chills. Denies pain/injury elsewhere. No other complaints.    HEALTH ISSUES - PROBLEM Dx:  Fall    Afib    HTN (hypertension)    HLD (hyperlipidemia)    DM (diabetes mellitus)    Prophylactic measure    Fracture of humeral head, right, closed    Social problem          MEDICATIONS  (STANDING):  atorvastatin 20 milliGRAM(s) Oral at bedtime  diltiazem    milliGRAM(s) Oral daily  insulin lispro (ADMELOG) corrective regimen sliding scale   SubCutaneous Before meals and at bedtime  lisinopril 10 milliGRAM(s) Oral daily  magnesium oxide 400 milliGRAM(s) Oral three times a day with meals  metoprolol tartrate 100 milliGRAM(s) Oral two times a day  potassium chloride    Tablet ER 40 milliEquivalent(s) Oral once  potassium chloride  10 mEq/100 mL IVPB 10 milliEquivalent(s) IV Intermittent every 1 hour    Allergies    No Known Allergies    Intolerances                            13.1   7.67  )-----------( 158      ( 27 Nov 2023 09:50 )             40.1     27 Nov 2023 09:50    137    |  105    |  16     ----------------------------<  217    3.1     |  25     |  0.91     Ca    9.3        27 Nov 2023 09:50  Phos  2.5       27 Nov 2023 09:50  Mg     1.7       27 Nov 2023 09:50    TPro  6.7    /  Alb  3.1    /  TBili  1.1    /  DBili  0.3    /  AST  18     /  ALT  32     /  AlkPhos  83     27 Nov 2023 09:50      Vital Signs Last 24 Hrs  T(C): 36.6 (11-27-23 @ 08:40), Max: 37.3 (11-26-23 @ 19:29)  T(F): 97.9 (11-27-23 @ 08:40), Max: 99.2 (11-26-23 @ 19:29)  HR: 96 (11-27-23 @ 08:40) (88 - 104)  BP: 138/85 (11-27-23 @ 08:40) (138/85 - 153/87)  BP(mean): --  RR: 16 (11-27-23 @ 08:40) (16 - 18)  SpO2: 96% (11-27-23 @ 08:40) (95% - 98%)  Imaging: XR demonstrates L proximal humerus fracture on wet read    Physical Exam  Gen: NAD, Alert and Awake, Follows Commands  LUE: Skin intact, Moderate Swelling to shoulder noted. Cannot AROM shoulder due to pain. r/u/m/ain/pin function intact. SILT over deltoid. SILT digits 1-5, warm to touch. 2+ radial pulse. Compartments soft and compressible.     A/P: 77y Female with L proximal humerus fracture  -Pain control  -NWB LUE with sling    -Sling at all times  -Ice plenty  -Active movement of fingers/elbow encouraged  -Ca/Vit D, outpt osteoporosis workup  -Possible need for surgical intervention discussed with patient  - All question answered  - Continue with care plan as per medicine team  - Needs Medical clearance and cardiac clearance before discharge.   -Follow up with Dr. El Bauer as outpatient within 1 week, call office at [phone]  for appointment   - Ortho stable  for DC   77F from home ambulates independently, lives alone with no HHA, PMH of Afib?, on Eliquis, HTN, DM, HLD presenting with fall this evening. Patient states taht she was trying to get something on the floor when she got tripped up and landed hard on her left shoulder. She was too weak and in pain to be able to get up. She was on the ground for 2 hours, until she was found by her son. She has no history of falls, and is unable to explain why she is on Eliquis, and asks to defer to her PCP, Dr. Nieves.     77y Female RHD presents c/o L shoulder pain s/p mechanical fall. Patient states that she tripped and fell when attempting to get something from the floor yesterday night. Patient states that she is in moderate pain and pain is improving with medication. She notes that she lives at home by herself and would like to see if she could get home health aid. Patient wants to speak with her primary care doctor, cardiologist and family before proceeding with surgery. Patient denies HS/LOC. Denies numbness/tingling. Denies fever/chills. Denies pain/injury elsewhere. No other complaints.    HEALTH ISSUES - PROBLEM Dx:  Fall    Afib    HTN (hypertension)    HLD (hyperlipidemia)    DM (diabetes mellitus)    Prophylactic measure    Fracture of humeral head, right, closed    Social problem          MEDICATIONS  (STANDING):  atorvastatin 20 milliGRAM(s) Oral at bedtime  diltiazem    milliGRAM(s) Oral daily  insulin lispro (ADMELOG) corrective regimen sliding scale   SubCutaneous Before meals and at bedtime  lisinopril 10 milliGRAM(s) Oral daily  magnesium oxide 400 milliGRAM(s) Oral three times a day with meals  metoprolol tartrate 100 milliGRAM(s) Oral two times a day  potassium chloride    Tablet ER 40 milliEquivalent(s) Oral once  potassium chloride  10 mEq/100 mL IVPB 10 milliEquivalent(s) IV Intermittent every 1 hour    Allergies    No Known Allergies    Intolerances                            13.1   7.67  )-----------( 158      ( 27 Nov 2023 09:50 )             40.1     27 Nov 2023 09:50    137    |  105    |  16     ----------------------------<  217    3.1     |  25     |  0.91     Ca    9.3        27 Nov 2023 09:50  Phos  2.5       27 Nov 2023 09:50  Mg     1.7       27 Nov 2023 09:50    TPro  6.7    /  Alb  3.1    /  TBili  1.1    /  DBili  0.3    /  AST  18     /  ALT  32     /  AlkPhos  83     27 Nov 2023 09:50      Vital Signs Last 24 Hrs  T(C): 36.6 (11-27-23 @ 08:40), Max: 37.3 (11-26-23 @ 19:29)  T(F): 97.9 (11-27-23 @ 08:40), Max: 99.2 (11-26-23 @ 19:29)  HR: 96 (11-27-23 @ 08:40) (88 - 104)  BP: 138/85 (11-27-23 @ 08:40) (138/85 - 153/87)  BP(mean): --  RR: 16 (11-27-23 @ 08:40) (16 - 18)  SpO2: 96% (11-27-23 @ 08:40) (95% - 98%)  Imaging: XR demonstrates L proximal humerus fracture on wet read    Physical Exam  Gen: NAD, Alert and Awake, Follows Commands  LUE: Skin intact, Moderate Swelling to shoulder noted. Cannot AROM shoulder due to pain. r/u/m/ain/pin function intact. SILT over deltoid. SILT digits 1-5, warm to touch. 2+ radial pulse. Compartments soft and compressible.     A/P: 77y Female with L proximal humerus fracture  -Pain control  -NWB LUE with sling    -Sling at all times  -Ice plenty  -Active movement of fingers/elbow encouraged  -Ca/Vit D, outpt osteoporosis workup  -Possible need for surgical intervention discussed with patient  - All question answered  - Continue with care plan as per medicine team  - Needs Medical clearance and cardiac clearance before discharge.   -Follow up with Dr. El Bauer as outpatient within 1 week, call office at [phone]  for appointment   - Ortho stable  for DC   77F from home ambulates independently, lives alone with no HHA, PMH of Afib?, on Eliquis, HTN, DM, HLD presenting with fall this evening. Patient states taht she was trying to get something on the floor when she got tripped up and landed hard on her left shoulder. She was too weak and in pain to be able to get up. She was on the ground for 2 hours, until she was found by her son. She has no history of falls, and is unable to explain why she is on Eliquis, and asks to defer to her PCP, Dr. Nieves.     77y Female RHD presents c/o L shoulder pain s/p mechanical fall. Patient states that she tripped and fell when attempting to get something from the floor yesterday night. Patient states that she is in moderate pain and pain is improving with medication. She notes that she lives at home by herself and would like to see if she could get home health aid. Patient wants to speak with her primary care doctor, cardiologist and family before proceeding with surgery. Patient denies HS/LOC. Denies numbness/tingling. Denies fever/chills. Denies pain/injury elsewhere. No other complaints.    HEALTH ISSUES - PROBLEM Dx:  Fall    Afib    HTN (hypertension)    HLD (hyperlipidemia)    DM (diabetes mellitus)    Prophylactic measure    Fracture of humeral head, right, closed    Social problem          MEDICATIONS  (STANDING):  atorvastatin 20 milliGRAM(s) Oral at bedtime  diltiazem    milliGRAM(s) Oral daily  insulin lispro (ADMELOG) corrective regimen sliding scale   SubCutaneous Before meals and at bedtime  lisinopril 10 milliGRAM(s) Oral daily  magnesium oxide 400 milliGRAM(s) Oral three times a day with meals  metoprolol tartrate 100 milliGRAM(s) Oral two times a day  potassium chloride    Tablet ER 40 milliEquivalent(s) Oral once  potassium chloride  10 mEq/100 mL IVPB 10 milliEquivalent(s) IV Intermittent every 1 hour    Allergies    No Known Allergies    Intolerances                            13.1   7.67  )-----------( 158      ( 27 Nov 2023 09:50 )             40.1     27 Nov 2023 09:50    137    |  105    |  16     ----------------------------<  217    3.1     |  25     |  0.91     Ca    9.3        27 Nov 2023 09:50  Phos  2.5       27 Nov 2023 09:50  Mg     1.7       27 Nov 2023 09:50    TPro  6.7    /  Alb  3.1    /  TBili  1.1    /  DBili  0.3    /  AST  18     /  ALT  32     /  AlkPhos  83     27 Nov 2023 09:50      Vital Signs Last 24 Hrs  T(C): 36.6 (11-27-23 @ 08:40), Max: 37.3 (11-26-23 @ 19:29)  T(F): 97.9 (11-27-23 @ 08:40), Max: 99.2 (11-26-23 @ 19:29)  HR: 96 (11-27-23 @ 08:40) (88 - 104)  BP: 138/85 (11-27-23 @ 08:40) (138/85 - 153/87)  BP(mean): --  RR: 16 (11-27-23 @ 08:40) (16 - 18)  SpO2: 96% (11-27-23 @ 08:40) (95% - 98%)  Imaging: XR demonstrates L proximal humerus fracture on wet read    Physical Exam  Gen: NAD, Alert and Awake, Follows Commands  LUE: Skin intact, Moderate Swelling to shoulder noted. Cannot AROM shoulder due to pain. r/u/m/ain/pin function intact. SILT over deltoid. SILT digits 1-5, warm to touch. 2+ radial pulse. Compartments soft and compressible.     A/P: 77y Female with L proximal humerus fracture  -Pain control  -NWB LUE with sling    -Sling at all times  -Ice plenty  -Active movement of fingers/elbow encouraged  -Ca/Vit D, outpt osteoporosis workup  -Possible need for surgical intervention discussed with patient  - All question answered  - Continue with care plan as per medicine team  - Needs Medical clearance and cardiac clearance before discharge.   -Follow up with Dr. El Bauer as outpatient within 1 week, call office at [phone]  for appointment   - Ortho stable  for DC   77F from home ambulates independently, lives alone with no HHA, PMH of Afib?, on Eliquis, HTN, DM, HLD presenting with fall this evening. Patient states taht she was trying to get something on the floor when she got tripped up and landed hard on her left shoulder. She was too weak and in pain to be able to get up. She was on the ground for 2 hours, until she was found by her son. She has no history of falls, and is unable to explain why she is on Eliquis, and asks to defer to her PCP, Dr. Nieves.     77y Female RHD presents c/o L shoulder pain s/p mechanical fall. Patient states that she tripped and fell when attempting to get something from the floor yesterday night. Patient states that she is in moderate pain and pain is improving with medication. She notes that she lives at home by herself and would like to see if she could get home health aid. Patient wants to speak with her primary care doctor, cardiologist and family before proceeding with surgery. Patient denies HS/LOC. Denies numbness/tingling. Denies fever/chills. Denies pain/injury elsewhere. No other complaints.    HEALTH ISSUES - PROBLEM Dx:  Fall    Afib    HTN (hypertension)    HLD (hyperlipidemia)    DM (diabetes mellitus)    Prophylactic measure    Fracture of humeral head, right, closed    Social problem          MEDICATIONS  (STANDING):  atorvastatin 20 milliGRAM(s) Oral at bedtime  diltiazem    milliGRAM(s) Oral daily  insulin lispro (ADMELOG) corrective regimen sliding scale   SubCutaneous Before meals and at bedtime  lisinopril 10 milliGRAM(s) Oral daily  magnesium oxide 400 milliGRAM(s) Oral three times a day with meals  metoprolol tartrate 100 milliGRAM(s) Oral two times a day  potassium chloride    Tablet ER 40 milliEquivalent(s) Oral once  potassium chloride  10 mEq/100 mL IVPB 10 milliEquivalent(s) IV Intermittent every 1 hour    Allergies    No Known Allergies    Intolerances                            13.1   7.67  )-----------( 158      ( 27 Nov 2023 09:50 )             40.1     27 Nov 2023 09:50    137    |  105    |  16     ----------------------------<  217    3.1     |  25     |  0.91     Ca    9.3        27 Nov 2023 09:50  Phos  2.5       27 Nov 2023 09:50  Mg     1.7       27 Nov 2023 09:50    TPro  6.7    /  Alb  3.1    /  TBili  1.1    /  DBili  0.3    /  AST  18     /  ALT  32     /  AlkPhos  83     27 Nov 2023 09:50      Vital Signs Last 24 Hrs  T(C): 36.6 (11-27-23 @ 08:40), Max: 37.3 (11-26-23 @ 19:29)  T(F): 97.9 (11-27-23 @ 08:40), Max: 99.2 (11-26-23 @ 19:29)  HR: 96 (11-27-23 @ 08:40) (88 - 104)  BP: 138/85 (11-27-23 @ 08:40) (138/85 - 153/87)  BP(mean): --  RR: 16 (11-27-23 @ 08:40) (16 - 18)  SpO2: 96% (11-27-23 @ 08:40) (95% - 98%)  Imaging: XR demonstrates L proximal humerus fracture on wet read    Physical Exam  Gen: NAD, Alert and Awake, Follows Commands  LUE: Skin intact, Moderate Swelling to shoulder noted. Cannot AROM shoulder due to pain. r/u/m/ain/pin function intact. SILT over deltoid. SILT digits 1-5, warm to touch. 2+ radial pulse. Compartments soft and compressible.     A/P: 77y Female with L proximal humerus fracture  -Treat with closed fracture treatment / care  -Pain control  -NWB LUE with sling    -Sling at all times  -Ice plenty  -Active movement of fingers/elbow encouraged  -Ca/Vit D, outpt osteoporosis workup  -Possible need for surgical intervention discussed with patient  - All question answered  - Continue with care plan as per medicine team  - Needs Medical clearance and cardiac clearance before discharge.   -Follow up with Dr. El Bauer as outpatient within 1 week, call office at [phone]  for appointment   - Ortho stable  for DC   77F from home ambulates independently, lives alone with no HHA, PMH of Afib?, on Eliquis, HTN, DM, HLD presenting with fall this evening. Patient states taht she was trying to get something on the floor when she got tripped up and landed hard on her left shoulder. She was too weak and in pain to be able to get up. She was on the ground for 2 hours, until she was found by her son. She has no history of falls, and is unable to explain why she is on Eliquis, and asks to defer to her PCP, Dr. Nieves.     77y Female RHD presents c/o L shoulder pain s/p mechanical fall. Patient states that she tripped and fell when attempting to get something from the floor yesterday night. Patient states that she is in moderate pain and pain is improving with medication. She notes that she lives at home by herself and would like to see if she could get home health aid. Patient wants to speak with her primary care doctor, cardiologist and family before proceeding with surgery. Patient denies HS/LOC. Denies numbness/tingling. Denies fever/chills. Denies pain/injury elsewhere. No other complaints.    HEALTH ISSUES - PROBLEM Dx:  Fall    Afib    HTN (hypertension)    HLD (hyperlipidemia)    DM (diabetes mellitus)    Prophylactic measure    Fracture of humeral head, right, closed    Social problem          MEDICATIONS  (STANDING):  atorvastatin 20 milliGRAM(s) Oral at bedtime  diltiazem    milliGRAM(s) Oral daily  insulin lispro (ADMELOG) corrective regimen sliding scale   SubCutaneous Before meals and at bedtime  lisinopril 10 milliGRAM(s) Oral daily  magnesium oxide 400 milliGRAM(s) Oral three times a day with meals  metoprolol tartrate 100 milliGRAM(s) Oral two times a day  potassium chloride    Tablet ER 40 milliEquivalent(s) Oral once  potassium chloride  10 mEq/100 mL IVPB 10 milliEquivalent(s) IV Intermittent every 1 hour    Allergies    No Known Allergies    Intolerances                            13.1   7.67  )-----------( 158      ( 27 Nov 2023 09:50 )             40.1     27 Nov 2023 09:50    137    |  105    |  16     ----------------------------<  217    3.1     |  25     |  0.91     Ca    9.3        27 Nov 2023 09:50  Phos  2.5       27 Nov 2023 09:50  Mg     1.7       27 Nov 2023 09:50    TPro  6.7    /  Alb  3.1    /  TBili  1.1    /  DBili  0.3    /  AST  18     /  ALT  32     /  AlkPhos  83     27 Nov 2023 09:50      Vital Signs Last 24 Hrs  T(C): 36.6 (11-27-23 @ 08:40), Max: 37.3 (11-26-23 @ 19:29)  T(F): 97.9 (11-27-23 @ 08:40), Max: 99.2 (11-26-23 @ 19:29)  HR: 96 (11-27-23 @ 08:40) (88 - 104)  BP: 138/85 (11-27-23 @ 08:40) (138/85 - 153/87)  BP(mean): --  RR: 16 (11-27-23 @ 08:40) (16 - 18)  SpO2: 96% (11-27-23 @ 08:40) (95% - 98%)  Imaging: XR demonstrates L proximal humerus fracture on wet read    Physical Exam  Gen: NAD, Alert and Awake, Follows Commands  LUE: Skin intact, Moderate Swelling to shoulder noted. Cannot AROM shoulder due to pain. r/u/m/ain/pin function intact. SILT over deltoid. SILT digits 1-5, warm to touch. 2+ radial pulse. Compartments soft and compressible.     A/P: 77y Female with L proximal humerus fracture  -Treat with closed fracture treatment / care  -Pain control  -NWB LUE with sling    -Sling at all times  -Ice plenty  -Active movement of fingers/elbow encouraged  -Ca/Vit D, outpt osteoporosis workup  -Possible need for surgical intervention discussed with patient  - All question answered  - Continue with care plan as per medicine team  - Needs Medical clearance and cardiac clearance before discharge.   -Follow up with Dr. El Bauer as outpatient within 1 week, call office at [phone]  for appointment   - Ortho stable  for DC   77F from home ambulates independently, lives alone with no HHA, PMH of Afib?, on Eliquis, HTN, DM, HLD presenting with fall this evening. Patient states taht she was trying to get something on the floor when she got tripped up and landed hard on her left shoulder. She was too weak and in pain to be able to get up. She was on the ground for 2 hours, until she was found by her son. She has no history of falls, and is unable to explain why she is on Eliquis, and asks to defer to her PCP, Dr. Nieves.     77y Female RHD presents c/o L shoulder pain s/p mechanical fall. Patient states that she tripped and fell when attempting to get something from the floor yesterday night. Patient states that she is in moderate pain and pain is improving with medication. She notes that she lives at home by herself and would like to see if she could get home health aid. Patient wants to speak with her primary care doctor, cardiologist and family before proceeding with surgery. Patient denies HS/LOC. Denies numbness/tingling. Denies fever/chills. Denies pain/injury elsewhere. No other complaints.    HEALTH ISSUES - PROBLEM Dx:  Fall    Afib    HTN (hypertension)    HLD (hyperlipidemia)    DM (diabetes mellitus)    Prophylactic measure    Fracture of humeral head, right, closed    Social problem          MEDICATIONS  (STANDING):  atorvastatin 20 milliGRAM(s) Oral at bedtime  diltiazem    milliGRAM(s) Oral daily  insulin lispro (ADMELOG) corrective regimen sliding scale   SubCutaneous Before meals and at bedtime  lisinopril 10 milliGRAM(s) Oral daily  magnesium oxide 400 milliGRAM(s) Oral three times a day with meals  metoprolol tartrate 100 milliGRAM(s) Oral two times a day  potassium chloride    Tablet ER 40 milliEquivalent(s) Oral once  potassium chloride  10 mEq/100 mL IVPB 10 milliEquivalent(s) IV Intermittent every 1 hour    Allergies    No Known Allergies    Intolerances                            13.1   7.67  )-----------( 158      ( 27 Nov 2023 09:50 )             40.1     27 Nov 2023 09:50    137    |  105    |  16     ----------------------------<  217    3.1     |  25     |  0.91     Ca    9.3        27 Nov 2023 09:50  Phos  2.5       27 Nov 2023 09:50  Mg     1.7       27 Nov 2023 09:50    TPro  6.7    /  Alb  3.1    /  TBili  1.1    /  DBili  0.3    /  AST  18     /  ALT  32     /  AlkPhos  83     27 Nov 2023 09:50      Vital Signs Last 24 Hrs  T(C): 36.6 (11-27-23 @ 08:40), Max: 37.3 (11-26-23 @ 19:29)  T(F): 97.9 (11-27-23 @ 08:40), Max: 99.2 (11-26-23 @ 19:29)  HR: 96 (11-27-23 @ 08:40) (88 - 104)  BP: 138/85 (11-27-23 @ 08:40) (138/85 - 153/87)  BP(mean): --  RR: 16 (11-27-23 @ 08:40) (16 - 18)  SpO2: 96% (11-27-23 @ 08:40) (95% - 98%)  Imaging: XR demonstrates L proximal humerus fracture on wet read    Physical Exam  Gen: NAD, Alert and Awake, Follows Commands  LUE: Skin intact, Moderate Swelling to shoulder noted. Cannot AROM shoulder due to pain. r/u/m/ain/pin function intact. SILT over deltoid. SILT digits 1-5, warm to touch. 2+ radial pulse. Compartments soft and compressible.     A/P: 77y Female with L proximal humerus fracture  -Treat with closed fracture treatment / care  -Pain control  -NWB LUE with sling    -Sling at all times  -Ice plenty  -Active movement of fingers/elbow encouraged  -Ca/Vit D, outpt osteoporosis workup  -Possible need for surgical intervention discussed with patient  - All question answered  - Continue with care plan as per medicine team  - Needs Medical clearance and cardiac clearance before discharge.   -Follow up with Dr. El Bauer as outpatient within 1 week, call office at [phone]  for appointment   - Ortho stable  for DC